# Patient Record
Sex: FEMALE | Race: WHITE | NOT HISPANIC OR LATINO | Employment: FULL TIME | URBAN - METROPOLITAN AREA
[De-identification: names, ages, dates, MRNs, and addresses within clinical notes are randomized per-mention and may not be internally consistent; named-entity substitution may affect disease eponyms.]

---

## 2017-01-13 ENCOUNTER — TELEPHONE (OUTPATIENT)
Dept: GASTROENTEROLOGY | Facility: CLINIC | Age: 57
End: 2017-01-13

## 2017-01-13 DIAGNOSIS — R11.0 NAUSEA: ICD-10-CM

## 2017-01-13 DIAGNOSIS — R10.13 EPIGASTRIC ABDOMINAL PAIN: Primary | ICD-10-CM

## 2017-01-13 NOTE — TELEPHONE ENCOUNTER
----- Message from Olya Cadet MA sent at 1/13/2017  8:47 AM CST -----  Contact: [phone]/self       ----- Message -----     From: Cindy Hernandez     Sent: 1/13/2017   7:43 AM       To: Imer JACOBSEN Staff    Pt requesting to speak with you , states she was supposed to get an MRI done but she doesn't know what type of MRI . Please advise

## 2017-01-13 NOTE — TELEPHONE ENCOUNTER
Spoke to pt and schedule her for her Upper EUS with Dr Rowley on 1/19/17.  Prep instructions was discussed and mailed to pt.

## 2017-01-13 NOTE — TELEPHONE ENCOUNTER
She had abnormal pancreas on CT with epigastric pain and nausea.  Originally recommended MRI but after discussion with Dr. Guido, ( who talked with the patient at the time of her EGD about this) changed to EUS with Dr. Rowley.        I have discussed  This with the patient.  Can you please call to schedule EUS and labs.

## 2017-01-19 ENCOUNTER — ANESTHESIA (OUTPATIENT)
Dept: ENDOSCOPY | Facility: HOSPITAL | Age: 57
End: 2017-01-19
Payer: COMMERCIAL

## 2017-01-19 ENCOUNTER — SURGERY (OUTPATIENT)
Age: 57
End: 2017-01-19

## 2017-01-19 ENCOUNTER — HOSPITAL ENCOUNTER (OUTPATIENT)
Facility: HOSPITAL | Age: 57
Discharge: HOME | End: 2017-01-19
Attending: INTERNAL MEDICINE | Admitting: INTERNAL MEDICINE
Payer: COMMERCIAL

## 2017-01-19 ENCOUNTER — ANESTHESIA EVENT (OUTPATIENT)
Dept: ENDOSCOPY | Facility: HOSPITAL | Age: 57
End: 2017-01-19
Payer: COMMERCIAL

## 2017-01-19 VITALS
HEART RATE: 90 BPM | BODY MASS INDEX: 27.31 KG/M2 | OXYGEN SATURATION: 98 % | HEIGHT: 64 IN | RESPIRATION RATE: 20 BRPM | DIASTOLIC BLOOD PRESSURE: 89 MMHG | WEIGHT: 160 LBS | SYSTOLIC BLOOD PRESSURE: 156 MMHG | TEMPERATURE: 99 F

## 2017-01-19 DIAGNOSIS — R10.13 EPIGASTRIC ABDOMINAL PAIN: ICD-10-CM

## 2017-01-19 PROBLEM — Q45.3 PANCREATIC ABNORMALITY: Status: ACTIVE | Noted: 2017-01-19

## 2017-01-19 PROBLEM — R10.30 LOWER ABDOMINAL PAIN: Status: ACTIVE | Noted: 2017-01-19

## 2017-01-19 PROCEDURE — 25000003 PHARM REV CODE 250: Performed by: NURSE ANESTHETIST, CERTIFIED REGISTERED

## 2017-01-19 PROCEDURE — 25000003 PHARM REV CODE 250: Performed by: INTERNAL MEDICINE

## 2017-01-19 PROCEDURE — 37000009 HC ANESTHESIA EA ADD 15 MINS: Performed by: INTERNAL MEDICINE

## 2017-01-19 PROCEDURE — 88172 CYTP DX EVAL FNA 1ST EA SITE: CPT | Performed by: PATHOLOGY

## 2017-01-19 PROCEDURE — 43242 EGD US FINE NEEDLE BX/ASPIR: CPT | Performed by: INTERNAL MEDICINE

## 2017-01-19 PROCEDURE — 88172 CYTP DX EVAL FNA 1ST EA SITE: CPT | Mod: 26,,, | Performed by: PATHOLOGY

## 2017-01-19 PROCEDURE — 43242 EGD US FINE NEEDLE BX/ASPIR: CPT | Mod: ,,, | Performed by: INTERNAL MEDICINE

## 2017-01-19 PROCEDURE — 63600175 PHARM REV CODE 636 W HCPCS: Performed by: NURSE ANESTHETIST, CERTIFIED REGISTERED

## 2017-01-19 PROCEDURE — 37000008 HC ANESTHESIA 1ST 15 MINUTES: Performed by: INTERNAL MEDICINE

## 2017-01-19 PROCEDURE — 88173 CYTOPATH EVAL FNA REPORT: CPT | Mod: 26,,, | Performed by: PATHOLOGY

## 2017-01-19 PROCEDURE — 27201628 HC NEEDLE, EUSN-1, EUSN-3: Performed by: INTERNAL MEDICINE

## 2017-01-19 RX ORDER — MIDAZOLAM HYDROCHLORIDE 1 MG/ML
INJECTION, SOLUTION INTRAMUSCULAR; INTRAVENOUS
Status: DISCONTINUED | OUTPATIENT
Start: 2017-01-19 | End: 2017-01-19

## 2017-01-19 RX ORDER — SODIUM CHLORIDE 0.9 % (FLUSH) 0.9 %
3 SYRINGE (ML) INJECTION
Status: DISCONTINUED | OUTPATIENT
Start: 2017-01-19 | End: 2017-01-19 | Stop reason: HOSPADM

## 2017-01-19 RX ORDER — PROPOFOL 10 MG/ML
VIAL (ML) INTRAVENOUS
Status: DISCONTINUED | OUTPATIENT
Start: 2017-01-19 | End: 2017-01-19

## 2017-01-19 RX ORDER — SODIUM CHLORIDE 9 MG/ML
INJECTION, SOLUTION INTRAVENOUS CONTINUOUS
Status: DISCONTINUED | OUTPATIENT
Start: 2017-01-19 | End: 2017-01-19 | Stop reason: HOSPADM

## 2017-01-19 RX ORDER — KETAMINE HYDROCHLORIDE 50 MG/ML
INJECTION, SOLUTION INTRAMUSCULAR; INTRAVENOUS
Status: DISCONTINUED | OUTPATIENT
Start: 2017-01-19 | End: 2017-01-19

## 2017-01-19 RX ORDER — SODIUM CHLORIDE 0.9 % (FLUSH) 0.9 %
3 SYRINGE (ML) INJECTION EVERY 8 HOURS
Status: CANCELLED | OUTPATIENT
Start: 2017-01-19

## 2017-01-19 RX ORDER — HYDROMORPHONE HYDROCHLORIDE 2 MG/ML
0.5 INJECTION, SOLUTION INTRAMUSCULAR; INTRAVENOUS; SUBCUTANEOUS EVERY 5 MIN PRN
Status: CANCELLED | OUTPATIENT
Start: 2017-01-19

## 2017-01-19 RX ORDER — PROPOFOL 10 MG/ML
VIAL (ML) INTRAVENOUS CONTINUOUS PRN
Status: DISCONTINUED | OUTPATIENT
Start: 2017-01-19 | End: 2017-01-19

## 2017-01-19 RX ORDER — LIDOCAINE HCL/PF 100 MG/5ML
SYRINGE (ML) INTRAVENOUS
Status: DISCONTINUED | OUTPATIENT
Start: 2017-01-19 | End: 2017-01-19

## 2017-01-19 RX ORDER — HYDROMORPHONE HYDROCHLORIDE 2 MG/ML
0.2 INJECTION, SOLUTION INTRAMUSCULAR; INTRAVENOUS; SUBCUTANEOUS EVERY 5 MIN PRN
Status: CANCELLED | OUTPATIENT
Start: 2017-01-19

## 2017-01-19 RX ORDER — DIPHENHYDRAMINE HYDROCHLORIDE 50 MG/ML
25 INJECTION INTRAMUSCULAR; INTRAVENOUS EVERY 6 HOURS PRN
Status: CANCELLED | OUTPATIENT
Start: 2017-01-19

## 2017-01-19 RX ORDER — ACETAMINOPHEN 10 MG/ML
1000 INJECTION, SOLUTION INTRAVENOUS ONCE
Status: DISCONTINUED | OUTPATIENT
Start: 2017-01-19 | End: 2017-01-19 | Stop reason: HOSPADM

## 2017-01-19 RX ORDER — ONDANSETRON 2 MG/ML
4 INJECTION INTRAMUSCULAR; INTRAVENOUS DAILY PRN
Status: CANCELLED | OUTPATIENT
Start: 2017-01-19

## 2017-01-19 RX ORDER — GLYCOPYRROLATE 0.2 MG/ML
INJECTION INTRAMUSCULAR; INTRAVENOUS
Status: DISCONTINUED | OUTPATIENT
Start: 2017-01-19 | End: 2017-01-19

## 2017-01-19 RX ADMIN — TOPICAL ANESTHETIC 1 EACH: 200 SPRAY DENTAL; PERIODONTAL at 08:01

## 2017-01-19 RX ADMIN — PROPOFOL 117 MCG/KG/MIN: 10 INJECTION, EMULSION INTRAVENOUS at 08:01

## 2017-01-19 RX ADMIN — GLYCOPYRROLATE 0.4 MG: 0.2 INJECTION, SOLUTION INTRAMUSCULAR; INTRAVENOUS at 08:01

## 2017-01-19 RX ADMIN — SODIUM CHLORIDE: 0.9 INJECTION, SOLUTION INTRAVENOUS at 08:01

## 2017-01-19 RX ADMIN — KETAMINE HYDROCHLORIDE 25 MG: 50 INJECTION, SOLUTION, CONCENTRATE INTRAMUSCULAR; INTRAVENOUS at 08:01

## 2017-01-19 RX ADMIN — MIDAZOLAM 2 MG: 1 INJECTION INTRAMUSCULAR; INTRAVENOUS at 08:01

## 2017-01-19 RX ADMIN — LIDOCAINE HYDROCHLORIDE 100 MG: 20 INJECTION, SOLUTION INTRAVENOUS at 08:01

## 2017-01-19 RX ADMIN — PROPOFOL 50 MG: 10 INJECTION, EMULSION INTRAVENOUS at 08:01

## 2017-01-19 NOTE — DISCHARGE INSTRUCTIONS
Post Upper EUS Instructions:     Allyssa Ruiz  1/19/2017  Dwight Carter*    1. Do Not eat or drink anything for 1 hour. Try sips of water first. If tolerated, resume your regular diet or one recommended by your physician.  2. Do not drive, or operate machinery, make critical decisions, or do activities that require coordination or balance for 24 hours.  3. You may experience a sore throat for 24 to 48 hours. You may use throat lozenges or gargle with warm salt water to relieve the discomfort.  4. Because air was put into your stomach during the procedure, you may experience some belching.   Go directly to the emergency room if you notice any of the following:                              Chills and/or fever over 101                Persistent vomiting or vomiting with blood                Severe abdominal pain, other than gas cramps                Severe chest pain                Black, tarry stools    If you have any questions or problems, please call your Physician:    Dwight Carter* Phone: ***    Lab Results: [phone]    If a complication or emergency situation arises and you are unable to reach your Physician - GO TO THE EMERGENCY ROOM.

## 2017-01-19 NOTE — H&P
"Short Stay Endoscopy History and Physical  [facility] [place] [facility] [place]    PCP: Ruperto Duke Jr, MD  Referring MD: LJ Guido MD/KEENAN Riley    Procedure - EUS  ASA - per anesthesia  History of Anesthesia problems - no  Family history Anesthesia problems -  no   Plan of anesthesia - MAC    HPI:  This is a 56 y.o. female here for evaluation of : 25 Oct 16 CT findings - "Few subcentimeter hypodensities are noted within the pancreatic head, incompletely characterized.  No pancreatic ductal dilatation." She is also S/P EGD of 2 Nov 16 that showed small HH and several fundic gland gastric polyps with gastritis (H.p. NEG). Evaluation centers around prior epigastric pains. Today, patient is reporting intermittent S-I joint pains and lower abd pains with intermittent nausea. NO lipase available. Though MRI was initially scheduled, that study was put on hold and EUS scheduled instead. NO weight loss.       Dysphagia - no  Abdominal pain - Yes  Diarrhea - no    ROS:  Constitutional: No fevers, chills, No weight loss  CV: No chest pain  Pulm: No cough, No shortness of breath  Ophtho: No vision changes  GI: see HPI  Derm: No rash    Medical History:  has a past medical history of Hypertension. Gastric polyps; HH;     Surgical History:  has a past surgical history that includes lasix; Tubal ligation; Tonsillectomy; and Breast surgery (Bilateral).    Family History: family history includes Heart attack in her brother and mother..     Social History:  reports that she has never smoked. She does not have any smokeless tobacco history on file. She reports that she drinks about 6.0 oz of alcohol per week  She reports that she does not use illicit drugs.    Review of patient's allergies indicates:   Allergen Reactions    Pcn [penicillins] Other (See Comments)     Seizure as a child        Medications:   Prescriptions Prior to Admission   Medication Sig Dispense Refill Last Dose    losartan (COZAAR) 100 MG tablet   9 " 1/18/2017 at 7pm    ranitidine (ZANTAC) 150 MG tablet Take 1 tablet (150 mg total) by mouth 2 (two) times daily. 60 tablet 3        Objective Findings:    Vital Signs:   Vitals:    01/19/17 0816   BP: (!) 147/85   Pulse: 71   Resp: 18   Temp: 98.5 °F (36.9 °C)       Physical Exam:  General Appearance: Well appearing in no acute distress  Eyes:    No scleral icterus  ENT: Neck supple, Lips, mucosa, and tongue normal; teeth and gums normal  Lungs: CTA anteriorly  Heart:  Regular rate, S1, S2 normal, no murmurs heard.  Abdomen: Obese, soft, non tender, non distended with normal bowel sounds. No hepatosplenomegaly, ascites, or mass.  Extremities: No edema  Skin: No rash    Labs:  Lab Results   Component Value Date    CHOL 238 (H) 03/11/2016    TRIG 98 03/11/2016    HDL 63 03/11/2016    ALT 32 03/11/2016    AST 23 03/11/2016     03/11/2016    K 4.7 03/11/2016     03/11/2016    CREATININE 0.9 03/11/2016    BUN 16 03/11/2016    CO2 31 (H) 03/11/2016    TSH 2.911 03/11/2016       Plan:  Will proceed with EUS, upper. The impression and plan was discussed in detail with the patient and family. All questions have been answered and the patient voices understanding of our plan at this point. The risk of the procedure was discussed in detail which includes but not limited to bleeding, infection, perforation in some cases requiring surgery and pancreatitis with its spectrum of complications.       Dwight Rowley MD, FACP, FACG, AGAF [facility]  [address], [address], [place] [address]  Phone: [phone] Fax: [phone] [address], [address], [place] [address]  Phone: [phone] Fax: [phone]

## 2017-01-19 NOTE — ANESTHESIA POSTPROCEDURE EVALUATION
"Anesthesia Post Evaluation    Patient: Allyssa Ruiz    Procedure(s) Performed: Procedure(s) (LRB):  ULTRASOUND-ENDOSCOPIC-UPPER (N/A)    Final Anesthesia Type: MAC  Patient location during evaluation: GI PACU  Patient participation: Yes- Able to Participate  Level of consciousness: awake and alert  Post-procedure vital signs: reviewed and stable  Pain management: adequate  Airway patency: patent  PONV status at discharge: No PONV  Anesthetic complications: no      Cardiovascular status: blood pressure returned to baseline and hemodynamically stable  Respiratory status: unassisted, spontaneous ventilation and room air  Hydration status: euvolemic  Follow-up not needed.        Visit Vitals    BP (!) 147/85 (BP Location: Left arm, Patient Position: Sitting, BP Method: Automatic)    Pulse 71    Temp 36.9 °C (98.5 °F) (Oral)    Resp 18    Ht 5' 4" (1.626 m)    Wt 72.6 kg (160 lb)    SpO2 98%    Breastfeeding No    BMI 27.46 kg/m2       Pain/Rubén Score: Pain Assessment Performed: Yes (1/19/2017  8:18 AM)      "

## 2017-01-19 NOTE — IP AVS SNAPSHOT
[facility]  [address]  [place] [address]  Phone: [phone]           Patient Discharge Instructions     Our goal is to set you up for success. This packet includes information on your condition, medications, and your home care. It will help you to care for yourself so you don't get sicker and need to go back to the hospital.     Please ask your nurse if you have any questions.        There are many details to remember when preparing to leave the hospital. Here is what you will need to do:    1. Take your medicine. If you are prescribed medications, review your Medication List in the following pages. You may have new medications to  at the pharmacy and others that you'll need to stop taking. Review the instructions for how and when to take your medications. Talk with your doctor or nurses if you are unsure of what to do.     2. Go to your follow-up appointments. Specific follow-up information is listed in the following pages. Your may be contacted by a transition nurse or clinical provider about future appointments. Be sure we have all of the phone numbers to reach you, if needed. Please contact your provider's office if you are unable to make an appointment.     3. Watch for warning signs. Your doctor or nurse will give you detailed warning signs to watch for and when to call for assistance. These instructions may also include educational information about your condition. If you experience any of warning signs to your health, call your doctor.               ** Verify the list of medication(s) below is accurate and up to date. Carry this with you in case of emergency. If your medications have changed, please notify your healthcare provider.             Medication List      CONTINUE taking these medications        Additional Info                      losartan 100 MG tablet   Commonly known as:  COZAAR   Refills:  9      Begin Date    AM    Noon    PM    Bedtime       ranitidine 150  MG tablet   Commonly known as:  ZANTAC   Quantity:  60 tablet   Refills:  3   Dose:  150 mg    Instructions:  Take 1 tablet (150 mg total) by mouth 2 (two) times daily.     Begin Date    AM    Noon    PM    Bedtime                  Please bring to all follow up appointments:    1. A copy of your discharge instructions.  2. All medicines you are currently taking in their original bottles.  3. Identification and insurance card.    Please arrive 15 minutes ahead of scheduled appointment time.    Please call 24 hours in advance if you must reschedule your appointment and/or time.        Follow-up Information     Follow up with Ruperto Duke Jr, MD.    Specialty:  Internal Medicine    Why:  As needed    Contact information:    [address]  [address] [place] [address]  [facility] [id number] [phone]          Follow up with KEENAN Ramirez In 4 weeks.    Specialty:  Gastroenterology    Contact information:    [address]  [place] [id number] [phone]          Follow up with Dwight Rowley MD.    Specialty:  Gastroenterology    Why:  As needed, Office will call with biopsy / pathology report    Contact information:    [address]  [address]  [place] [id number] [phone]          Discharge Instructions     Future Orders    Diet general     Questions:    Total calories:      Fat restriction, if any:      Protein restriction, if any:      Na restriction, if any:      Fluid restriction:      Additional restrictions:          Discharge Instructions       Post Upper EUS Instructions:     Allyssa Ruiz  1/19/2017  Dwight Carter*    1. Do Not eat or drink anything for 1 hour. Try sips of water first. If tolerated, resume your regular diet or one recommended by your physician.  2. Do not drive, or operate machinery, make critical decisions, or do activities that require coordination or balance for 24 hours.  3. You may experience a sore throat for 24 to 48 hours. You may use throat  "lozenges or gargle with warm salt water to relieve the discomfort.  4. Because air was put into your stomach during the procedure, you may experience some belching.   Go directly to the emergency room if you notice any of the following:                              Chills and/or fever over 101                Persistent vomiting or vomiting with blood                Severe abdominal pain, other than gas cramps                Severe chest pain                Black, tarry stools    If you have any questions or problems, please call your Physician:    Dwight Carter* Phone: ***    Lab Results: [phone]    If a complication or emergency situation arises and you are unable to reach your Physician - GO TO THE EMERGENCY ROOM.        Primary Diagnosis     Your primary diagnosis was:  Abdominal Pain, Pit Of Stomach      Admission Information     Date & Time Provider Department CSN    1/19/2017  6:53 AM Dwight Rowley MD [facility] [id number]      Care Providers     Provider Role Specialty Primary office phone    Dwight Rowley MD Attending Provider Gastroenterology [phone]    Dwight Rowley MD Surgeon  Gastroenterology [phone]      Your Vitals Were     BP Pulse Temp Resp Height Weight    154/83 (Patient Position: Lying, BP Method: Automatic) 74 98.5 °F (36.9 °C) (Oral) 18 5' 4" (1.626 m) 72.6 kg (160 lb)    SpO2 BMI             95% 27.46 kg/m2         Recent Lab Values     No lab values to display.      Pending Labs     Order Current Status    Cytology Specimen-FNA Radiology Guided, Bronch/EBUS, EUS/GI with Path Adequacy Collected (01/19/17 1041)      Allergies as of 1/19/2017        Reactions    Pcn [Penicillins] Other (See Comments)    Seizure as a child       [facility]     [facility] Nurse Care Line - 24/7 Assistance  Unless otherwise directed by your provider, please contact [facility], our nurse care line that is available " for 24/7 assistance.     Registered nurses in the [facility] provide clinical advisement, health education, appointment booking, and other advisory services.  Call for this free service at [phone].        Advance Directives     An advance directive is a document which, in the event you are no longer able to make decisions for yourself, tells your healthcare team what kind of treatment you do or do not want to receive, or who you would like to make those decisions for you.  If you do not currently have an advance directive, [facility] encourages you to create one.  For more information call:  [phone]), [phone] ([phone]),  or log on to [email or web address].        Language Assistance Services     ATTENTION: Language assistance services are available, free of charge. Please call [phone].      ATENCIÓN: Si habla español, tiene a ellis disposición servicios gratuitos de asistencia lingüística. Llame al [phone].     [facility] Ý: N?u b?n nói Ti?ng Vi?t, có các d?ch v? h? tr? ngôn ng? mi?n phí dành cho b?n. G?i s? [phone].         [facility] complies with applicable Federal civil rights laws and does not discriminate on the basis of race, color, national origin, age, disability, or sex.

## 2017-01-19 NOTE — TRANSFER OF CARE
"Anesthesia Transfer of Care Note    Patient: Allyssa Ruiz    Procedure(s) Performed: Procedure(s) (LRB):  ULTRASOUND-ENDOSCOPIC-UPPER (N/A)    Patient location: GI    Anesthesia Type: MAC    Transport from OR: Transported from OR on room air with adequate spontaneous ventilation    Post pain: adequate analgesia    Post assessment: tolerated procedure well and no apparent anesthetic complications    Post vital signs: stable    Level of consciousness: awake, alert and oriented    Nausea/Vomiting: no nausea/vomiting    Complications: none          Last vitals:   Visit Vitals    BP (!) 147/85 (BP Location: Left arm, Patient Position: Sitting, BP Method: Automatic)    Pulse 71    Temp 36.9 °C (98.5 °F) (Oral)    Resp 18    Ht 5' 4" (1.626 m)    Wt 72.6 kg (160 lb)    SpO2 98%    Breastfeeding No    BMI 27.46 kg/m2     "

## 2017-01-19 NOTE — ANESTHESIA PREPROCEDURE EVALUATION
01/19/2017     Allyssa Ruiz is a 56 y.o., female here for EUS.    Vitals:    01/19/17 0816   BP: (!) 147/85   Pulse: 71   Resp: 18   Temp: 36.9 °C (98.5 °F)     Review of patient's allergies indicates:   Allergen Reactions    Pcn [penicillins] Other (See Comments)     Seizure as a child      Past Medical History   Diagnosis Date    Hypertension      Past Surgical History   Procedure Laterality Date    Lasix      Tubal ligation      Tonsillectomy      Breast surgery Bilateral      breast augmentation     Patient Active Problem List   Diagnosis    Family history of ischemic heart disease    Essential hypertension    Epigastric pain    Epigastric abdominal pain     Lab Results   Component Value Date    CHOL 238 (H) 03/11/2016    TRIG 98 03/11/2016    HDL 63 03/11/2016    ALT 32 03/11/2016    AST 23 03/11/2016     03/11/2016    K 4.7 03/11/2016     03/11/2016    CREATININE 0.9 03/11/2016    BUN 16 03/11/2016    CO2 31 (H) 03/11/2016    TSH 2.911 03/11/2016       [facility] Anesthesia Evaluation    I have reviewed the Patient Summary Reports.    I have reviewed the Nursing Notes.   I have reviewed the Medications.     Review of Systems  Anesthesia Hx:  No problems with previous Anesthesia  Denies Family Hx of Anesthesia complications.   Denies Personal Hx of Anesthesia complications.   Social:  Non-Smoker, No Alcohol Use        Physical Exam  General:  Obesity    Airway/Jaw/Neck:  Airway Findings: Mallampati: III TM Distance: 4 - 6 cm  Jaw/Neck Findings:  Neck ROM: Normal ROM      Dental:  Dental Findings: In tactStates sometimes top tooth comes out        Mental Status:  Mental Status Findings:  Cooperative, Alert and Oriented         Anesthesia Plan  Type of Anesthesia, risks & benefits discussed:  Anesthesia Type:  general, MAC  Patient's Preference:   Intra-op Monitoring Plan:   Intra-op  Monitoring Plan Comments:   Post Op Pain Control Plan:   Post Op Pain Control Plan Comments:   Induction:    Beta Blocker:         Informed Consent: Patient understands risks and agrees with Anesthesia plan.  Questions answered. Anesthesia consent signed with patient.  ASA Score: 2     Day of Surgery Review of History & Physical: I have interviewed and examined the patient. I have reviewed the patient's H&P dated:  There are no significant changes.          Ready For Surgery From Anesthesia Perspective.

## 2017-01-26 ENCOUNTER — TELEPHONE (OUTPATIENT)
Dept: GASTROENTEROLOGY | Facility: CLINIC | Age: 57
End: 2017-01-26

## 2017-01-26 NOTE — TELEPHONE ENCOUNTER
Spoke with patient states that she was supposed to have lab work completed. Advised Reta to place a written order for patient to go to [facility]. Patient was informed to  request at . Verbalized understanding.

## 2017-01-26 NOTE — TELEPHONE ENCOUNTER
Left message for patient to return call to office to schedule appointment for blood work.   ----- Message from Cindy Hernandez sent at 1/26/2017  8:45 AM CST -----  Contact: [phone]/ self   Pt its requesting to speak with you in regarding blood work she was supposed to get done , but she haven't heard anything about scheduling them  . Please advise

## 2017-01-26 NOTE — TELEPHONE ENCOUNTER
Patient walked in to get written order for lab work. Patient also requesting to have results read for EUS. Advised patient message would be sent to NP to contact her.

## 2017-01-26 NOTE — TELEPHONE ENCOUNTER
----- Message from Cindy Hernandez sent at 1/26/2017  8:41 AM CST -----  Contact: [phone]/ self   Pt its requesting test results done 01/19/17 . Please advise

## 2017-01-27 LAB — COPY RECEIVED FROM: NORMAL

## 2017-01-30 ENCOUNTER — TELEPHONE (OUTPATIENT)
Dept: GASTROENTEROLOGY | Facility: CLINIC | Age: 57
End: 2017-01-30

## 2017-01-30 DIAGNOSIS — K86.9 PANCREATIC LESION: Primary | ICD-10-CM

## 2017-01-30 NOTE — TELEPHONE ENCOUNTER
Spoke with patient to inform her that labs were fine and she should be expecting phone call from . Verbalized understanding.  ----- Message from KEENAN Ramirez sent at 1/30/2017  1:32 PM CST -----  Let pt know that labs are fine.  We will call after Dr. Rowley reviews path studies for further recommendations.

## 2017-01-31 ENCOUNTER — TELEPHONE (OUTPATIENT)
Dept: GASTROENTEROLOGY | Facility: CLINIC | Age: 57
End: 2017-01-31

## 2017-01-31 NOTE — TELEPHONE ENCOUNTER
----- Message from Yasmine Wayne sent at 1/31/2017  9:18 AM CST -----  Contact: [phone]  Patient is returning your call

## 2017-02-01 ENCOUNTER — TELEPHONE (OUTPATIENT)
Dept: GASTROENTEROLOGY | Facility: CLINIC | Age: 57
End: 2017-02-01

## 2017-02-01 NOTE — TELEPHONE ENCOUNTER
Spoke with pt and was notified that pt could have the CT of abdomen with pancreatic protocol done at [facility] . Pt  Will check with her insurance company first and will get back in touch with our office.

## 2017-02-01 NOTE — TELEPHONE ENCOUNTER
----- Message from Renee Rios sent at 2/1/2017  3:01 PM CST -----  Contact: self/[phone]  Patient is returning your call about a CAT scan.  Please advise

## 2017-02-06 ENCOUNTER — TELEPHONE (OUTPATIENT)
Dept: GASTROENTEROLOGY | Facility: CLINIC | Age: 57
End: 2017-02-06

## 2017-02-06 NOTE — TELEPHONE ENCOUNTER
Pt was just calling to let us know that she would be proceeding with CT of abdomen with pancreatic protocol at [facility] .

## 2017-02-06 NOTE — TELEPHONE ENCOUNTER
----- Message from Reta Jenkins sent at 2/6/2017  2:49 PM CST -----  Contact: Self [phone]  Patient Returning Your Phone Call

## 2017-02-06 NOTE — TELEPHONE ENCOUNTER
----- Message from Renee Rios sent at 2/6/2017 11:18 AM CST -----  Contact: self/[phone]  Patient would like to speak with you about a test she is scheduled for on tomorrow 02/07/17.  Please advise

## 2017-02-06 NOTE — TELEPHONE ENCOUNTER
Spoke with patient to inform her that all of her organs will be checked but CT will focus more on pancreatis. Patient verbalized understanding.  ----- Message from Reta Jenkins sent at 2/6/2017  2:47 PM CST -----  Contact: Self [phone]  Patient Returning Your Phone Call

## 2017-02-06 NOTE — TELEPHONE ENCOUNTER
----- Message from Renee Rios sent at 2/6/2017 11:17 AM CST -----  Contact: self/[phone]  Patient would like to speak with you about a test she is scheduled for on tomorrow 02/07/17.  Please advise

## 2017-02-07 ENCOUNTER — HOSPITAL ENCOUNTER (OUTPATIENT)
Dept: RADIOLOGY | Facility: HOSPITAL | Age: 57
Discharge: HOME | End: 2017-02-07
Attending: INTERNAL MEDICINE
Payer: COMMERCIAL

## 2017-02-07 DIAGNOSIS — K86.9 PANCREATIC LESION: ICD-10-CM

## 2017-02-07 PROCEDURE — 74170 CT ABD WO CNTRST FLWD CNTRST: CPT | Mod: 26,,, | Performed by: RADIOLOGY

## 2017-02-07 PROCEDURE — 25500020 PHARM REV CODE 255: Performed by: INTERNAL MEDICINE

## 2017-02-07 PROCEDURE — 74170 CT ABD WO CNTRST FLWD CNTRST: CPT | Mod: TC

## 2017-02-07 RX ADMIN — IOHEXOL 75 ML: 350 INJECTION, SOLUTION INTRAVENOUS at 10:02

## 2017-05-11 LAB
ALBUMIN SERPL-MCNC: 4.6 G/DL (ref 3.6–5.1)
ALBUMIN/GLOB SERPL: 1.9 (CALC) (ref 1–2.5)
ALP SERPL-CCNC: 61 U/L (ref 33–130)
ALT SERPL-CCNC: 25 U/L (ref 6–29)
AST SERPL-CCNC: 18 U/L (ref 10–35)
BASOPHILS # BLD AUTO: 57 CELLS/UL (ref 0–200)
BASOPHILS NFR BLD AUTO: 0.8 %
BILIRUB SERPL-MCNC: 0.8 MG/DL (ref 0.2–1.2)
BUN SERPL-MCNC: 15 MG/DL (ref 7–25)
BUN/CREAT SERPL: NORMAL (CALC) (ref 6–22)
CALCIUM SERPL-MCNC: 9.3 MG/DL (ref 8.6–10.4)
CHLORIDE SERPL-SCNC: 102 MMOL/L (ref 98–110)
CO2 SERPL-SCNC: 30 MMOL/L (ref 20–31)
COPY(IES) SENT TO:: NORMAL
CREAT SERPL-MCNC: 0.82 MG/DL (ref 0.5–1.05)
EOSINOPHIL # BLD AUTO: 298 CELLS/UL (ref 15–500)
EOSINOPHIL NFR BLD AUTO: 4.2 %
ERYTHROCYTE [DISTWIDTH] IN BLOOD BY AUTOMATED COUNT: 13 % (ref 11–15)
GFR SERPL CREATININE-BSD FRML MDRD: 79 ML/MIN/1.73M2
GLOBULIN SER CALC-MCNC: 2.4 G/DL (CALC) (ref 1.9–3.7)
GLUCOSE SERPL-MCNC: 84 MG/DL (ref 65–99)
HCT VFR BLD AUTO: 43.2 % (ref 35–45)
HGB BLD-MCNC: 14.7 G/DL (ref 11.7–15.5)
LIPASE SERPL-CCNC: 27 U/L (ref 7–60)
LYMPHOCYTES # BLD AUTO: 2400 CELLS/UL (ref 850–3900)
LYMPHOCYTES NFR BLD AUTO: 33.8 %
MCH RBC QN AUTO: 31.4 PG (ref 27–33)
MCHC RBC AUTO-ENTMCNC: 34 G/DL (ref 32–36)
MCV RBC AUTO: 92.3 FL (ref 80–100)
MONOCYTES # BLD AUTO: 682 CELLS/UL (ref 200–950)
MONOCYTES NFR BLD AUTO: 9.6 %
NEUTROPHILS # BLD AUTO: 3664 CELLS/UL (ref 1500–7800)
NEUTROPHILS NFR BLD AUTO: 51.6 %
PLATELET # BLD AUTO: 220 THOUSAND/UL (ref 140–400)
PMV BLD REES-ECKER: 9.1 FL (ref 7.5–12.5)
POTASSIUM SERPL-SCNC: 3.9 MMOL/L (ref 3.5–5.3)
PROT SERPL-MCNC: 7 G/DL (ref 6.1–8.1)
RBC # BLD AUTO: 4.67 MILLION/UL (ref 3.8–5.1)
SODIUM SERPL-SCNC: 140 MMOL/L (ref 135–146)
WBC # BLD AUTO: 7.1 THOUSAND/UL (ref 3.8–10.8)

## 2017-12-12 ENCOUNTER — OFFICE VISIT (OUTPATIENT)
Dept: GASTROENTEROLOGY | Facility: CLINIC | Age: 57
End: 2017-12-12
Payer: COMMERCIAL

## 2017-12-12 ENCOUNTER — LAB VISIT (OUTPATIENT)
Dept: LAB | Facility: HOSPITAL | Age: 57
End: 2017-12-12
Attending: NURSE PRACTITIONER
Payer: COMMERCIAL

## 2017-12-12 ENCOUNTER — TELEPHONE (OUTPATIENT)
Dept: GASTROENTEROLOGY | Facility: CLINIC | Age: 57
End: 2017-12-12

## 2017-12-12 VITALS
BODY MASS INDEX: 26.34 KG/M2 | SYSTOLIC BLOOD PRESSURE: 125 MMHG | HEART RATE: 63 BPM | DIASTOLIC BLOOD PRESSURE: 72 MMHG | HEIGHT: 64 IN | WEIGHT: 154.31 LBS

## 2017-12-12 DIAGNOSIS — R19.7 DIARRHEA, UNSPECIFIED TYPE: Primary | ICD-10-CM

## 2017-12-12 DIAGNOSIS — R10.9 ABDOMINAL CRAMPING: ICD-10-CM

## 2017-12-12 DIAGNOSIS — R11.0 NAUSEA: ICD-10-CM

## 2017-12-12 DIAGNOSIS — Q45.3 PANCREATIC ABNORMALITY: ICD-10-CM

## 2017-12-12 DIAGNOSIS — R19.7 DIARRHEA, UNSPECIFIED TYPE: ICD-10-CM

## 2017-12-12 DIAGNOSIS — R93.5 ABNORMAL ABDOMINAL CT SCAN: ICD-10-CM

## 2017-12-12 LAB
ALBUMIN SERPL BCP-MCNC: 3.9 G/DL
ALP SERPL-CCNC: 73 U/L
ALT SERPL W/O P-5'-P-CCNC: 37 U/L
ANION GAP SERPL CALC-SCNC: 8 MMOL/L
AST SERPL-CCNC: 23 U/L
BASOPHILS # BLD AUTO: 0.04 K/UL
BASOPHILS NFR BLD: 0.5 %
BILIRUB SERPL-MCNC: 0.9 MG/DL
BUN SERPL-MCNC: 16 MG/DL
CALCIUM SERPL-MCNC: 9.2 MG/DL
CHLORIDE SERPL-SCNC: 105 MMOL/L
CO2 SERPL-SCNC: 27 MMOL/L
CREAT SERPL-MCNC: 0.8 MG/DL
DIFFERENTIAL METHOD: ABNORMAL
EOSINOPHIL # BLD AUTO: 0.4 K/UL
EOSINOPHIL NFR BLD: 4.9 %
ERYTHROCYTE [DISTWIDTH] IN BLOOD BY AUTOMATED COUNT: 13.6 %
EST. GFR  (AFRICAN AMERICAN): >60 ML/MIN/1.73 M^2
EST. GFR  (NON AFRICAN AMERICAN): >60 ML/MIN/1.73 M^2
GLUCOSE SERPL-MCNC: 72 MG/DL
HCT VFR BLD AUTO: 43.3 %
HGB BLD-MCNC: 15.2 G/DL
LIPASE SERPL-CCNC: 37 U/L
LIPASE SERPL-CCNC: 37 U/L
LYMPHOCYTES # BLD AUTO: 2.5 K/UL
LYMPHOCYTES NFR BLD: 32.1 %
MCH RBC QN AUTO: 31.7 PG
MCHC RBC AUTO-ENTMCNC: 35.1 G/DL
MCV RBC AUTO: 90 FL
MONOCYTES # BLD AUTO: 0.8 K/UL
MONOCYTES NFR BLD: 10.4 %
NEUTROPHILS # BLD AUTO: 4.1 K/UL
NEUTROPHILS NFR BLD: 51.8 %
PLATELET # BLD AUTO: 252 K/UL
PMV BLD AUTO: 10.4 FL
POTASSIUM SERPL-SCNC: 3.6 MMOL/L
PROT SERPL-MCNC: 7.3 G/DL
RBC # BLD AUTO: 4.79 M/UL
SODIUM SERPL-SCNC: 140 MMOL/L
WBC # BLD AUTO: 7.91 K/UL

## 2017-12-12 PROCEDURE — 87046 STOOL CULTR AEROBIC BACT EA: CPT | Mod: 59

## 2017-12-12 PROCEDURE — 99213 OFFICE O/P EST LOW 20 MIN: CPT | Mod: S$GLB,,, | Performed by: NURSE PRACTITIONER

## 2017-12-12 PROCEDURE — 99999 PR PBB SHADOW E&M-EST. PATIENT-LVL III: CPT | Mod: PBBFAC,,, | Performed by: NURSE PRACTITIONER

## 2017-12-12 PROCEDURE — 87427 SHIGA-LIKE TOXIN AG IA: CPT | Mod: 59

## 2017-12-12 PROCEDURE — 87045 FECES CULTURE AEROBIC BACT: CPT

## 2017-12-12 PROCEDURE — 80053 COMPREHEN METABOLIC PANEL: CPT

## 2017-12-12 PROCEDURE — 83690 ASSAY OF LIPASE: CPT

## 2017-12-12 PROCEDURE — 85025 COMPLETE CBC W/AUTO DIFF WBC: CPT

## 2017-12-12 PROCEDURE — 87209 SMEAR COMPLEX STAIN: CPT

## 2017-12-12 PROCEDURE — 82656 EL-1 FECAL QUAL/SEMIQ: CPT

## 2017-12-12 PROCEDURE — 87329 GIARDIA AG IA: CPT

## 2017-12-12 PROCEDURE — 87449 NOS EACH ORGANISM AG IA: CPT

## 2017-12-12 PROCEDURE — 36415 COLL VENOUS BLD VENIPUNCTURE: CPT

## 2017-12-12 RX ORDER — DICYCLOMINE HYDROCHLORIDE 20 MG/1
20 TABLET ORAL EVERY 6 HOURS
Qty: 120 TABLET | Refills: 6 | Status: SHIPPED | OUTPATIENT
Start: 2017-12-12 | End: 2018-01-11

## 2017-12-12 NOTE — PROGRESS NOTES
Subjective:       Patient ID: Allyssa Ruiz is a 57 y.o. female.    Chief Complaint: Abdominal Pain and Abdominal Cramping    HPI  Reports diarrhea 8-10 times a day with liquid foul smelling stool.  She has had diarrhea for over 6 months.  She has used imodium, lomotil, flagyl with no improvement.  She describes dull abdominal pain continuously with episodic cramping and urge for bowel movement.  Nausea but no vomiting.  Early satiety at times.  Will feel better after eating but only for a short while.  She has extensive workup last year for abdominal pain and nausea with EGD, US, CT, EUS and repeat CT.  I have reviewed these reports.  EUS:  Normal examined duodenum.                        - Erythematous mucosa in the greater curvature of                         the gastric body and antrum.                        - Multiple gastric polyps.                        - Small hiatus hernia.                        - A sonographic appearance of the embryologic                         ventral pancreas (ventral anlage) with shadowing                         vs. hypodense mass was observed. Fine needle                         aspiration performed.                        - A cystic lesion was seen in the genu of the                         pancreas. Cytology results are pending. However,                         the endosonographic appearance is a benign (true)                         cyst. Fine needle aspiration for fluid performed.                        - There was no sign of significant pathology in the                         pancreatic head, in the genu of the pancreas, in                         the pancreatic body, in the uncinate process of the                         pancreas and in the main pancreatic duct    FNA:  Review of pathology report from EUS dated 19 Jan 17:  SPECIMEN  1) FNA Pancreas Head (EUS)  2) FNA Pancreas Cyst (EUS)  FINAL PATHOLOGIC DIAGNOSIS  1. FINE-NEEDLE ASPIRATE PANCREAS HEAD (ASPIRATE  "SMEARS):  -Atypical  -Rare atypical cell group seen  2. FINE-NEEDLE ASPIRATE INCREASED CYST (ASPIRATE SMEARS):  -No malignant cells identified  -Bloody sample  Note: 1. Rare atypical cell groups seen. Additional sampling may be helpful if indicated. Correlate clinically.    IMP:- unclear clinical importance of "atypical cells"    REC:- D/C pending request for MRI  - Proceed with CT with / wo IVC using "pancreatic protocol" (order placed)  - F/U w KEENAN Riley after CT completed    Repeat CT:  The pancreatic parenchyma has an unchanged appearance from what was seen in October of 2016.  Again seen are subcentimeter hypodensities within the pancreatic parenchyma possibly representing small cystic lesions versus prominence of normal fat interspersed within the parenchyma.    Dr. Rowley recommended at that time repeat EUS at 3-6 months which she has not completed.    She had colonoscopy in summer 2016 at another facility.    Review of Systems   Constitutional: Positive for appetite change and fatigue. Negative for activity change, fever and unexpected weight change.   Respiratory: Negative for shortness of breath.    Cardiovascular: Negative for chest pain.   Gastrointestinal: Positive for abdominal pain, diarrhea and nausea. Negative for blood in stool and vomiting.   Genitourinary: Negative.    Skin:        Dryness, rash that has resolved   Neurological: Negative.    Psychiatric/Behavioral: Negative.        Objective:      Physical Exam   Constitutional: She appears well-developed and well-nourished. No distress.   HENT:   Head: Normocephalic.   Eyes: No scleral icterus.   Cardiovascular: Normal rate.    Pulmonary/Chest: Effort normal. No respiratory distress.   Abdominal: Soft. Bowel sounds are normal. She exhibits no distension and no mass. There is no tenderness. There is no guarding.   Neurological: She is alert.   Skin: Skin is warm and dry. She is not diaphoretic.   Psychiatric: She has a normal mood " and affect. Her behavior is normal. Judgment and thought content normal.   Vitals reviewed.      Assessment:       1. Diarrhea, unspecified type    2. Abdominal cramping    3. Pancreatic abnormality        Plan:         Allyssa was seen today for abdominal pain and abdominal cramping.    Diagnoses and all orders for this visit:    Diarrhea, unspecified type  -     Comprehensive metabolic panel; Future  -     Lipase; Future  -     CBC auto differential; Future  -     Clostridium difficile EIA; Future  -     Giardia / Cryptosporidum, EIA; Future  -     Stool culture; Future  -     Stool Exam-Ova,Cysts,Parasites; Future  -     Pancreatic elastase, fecal; Future    Abdominal cramping    Pancreatic abnormality    Other orders  -     dicyclomine (BENTYL) 20 mg tablet; Take 1 tablet (20 mg total) by mouth every 6 (six) hours.    Labs and stool studies.  Bentyl  Can try xifaxan if all negative.    We have discussed recommendations to repeat EUS.  She is not able to schedule currently, but will call back when able.

## 2017-12-13 ENCOUNTER — TELEPHONE (OUTPATIENT)
Dept: GASTROENTEROLOGY | Facility: CLINIC | Age: 57
End: 2017-12-13

## 2017-12-13 LAB
C DIFF GDH STL QL: NEGATIVE
C DIFF TOX A+B STL QL IA: NEGATIVE

## 2017-12-13 NOTE — TELEPHONE ENCOUNTER
----- Message from KEENAN Ramirez sent at 12/13/2017  8:53 AM CST -----  Let her know that lab work looks fine and that we will call her when stool results are in

## 2017-12-14 LAB — O+P STL TRI STN: NORMAL

## 2017-12-15 LAB
CRYPTOSP AG STL QL IA: NEGATIVE
G LAMBLIA AG STL QL IA: NEGATIVE

## 2017-12-16 LAB
BACTERIA STL CULT: NORMAL
BACTERIA STL CULT: NORMAL

## 2017-12-19 ENCOUNTER — TELEPHONE (OUTPATIENT)
Dept: GASTROENTEROLOGY | Facility: CLINIC | Age: 57
End: 2017-12-19

## 2017-12-19 NOTE — TELEPHONE ENCOUNTER
----- Message from KEENAN Ramirez sent at 12/19/2017  1:06 PM CST -----  Let her know that stool studies are ok.  One remains in process and will call with those results when in

## 2017-12-21 ENCOUNTER — TELEPHONE (OUTPATIENT)
Dept: GASTROENTEROLOGY | Facility: CLINIC | Age: 57
End: 2017-12-21

## 2017-12-21 LAB
ELASTASE 1, FECAL: 266.1 UG E/G STOOL
ELASTASE INTERPRETATION: NORMAL

## 2017-12-21 NOTE — TELEPHONE ENCOUNTER
----- Message from KEENAN Ramirez sent at 12/21/2017  4:06 PM CST -----  All stool studies are normal

## 2017-12-22 ENCOUNTER — TELEPHONE (OUTPATIENT)
Dept: GASTROENTEROLOGY | Facility: CLINIC | Age: 57
End: 2017-12-22

## 2017-12-22 NOTE — TELEPHONE ENCOUNTER
Spoke with pt and informed of last message regarding remainder of stool studies normal, pt verbalized understanding      ----- Message from Steffanie Marcelo sent at 12/22/2017  8:24 AM CST -----  Contact: [phone]/self  Patient returning your call. Please advise

## 2018-04-09 ENCOUNTER — TELEPHONE (OUTPATIENT)
Dept: PODIATRY | Facility: CLINIC | Age: 58
End: 2018-04-09

## 2018-04-09 ENCOUNTER — OFFICE VISIT (OUTPATIENT)
Dept: PODIATRY | Facility: CLINIC | Age: 58
End: 2018-04-09
Payer: COMMERCIAL

## 2018-04-09 VITALS
WEIGHT: 154 LBS | HEIGHT: 64 IN | BODY MASS INDEX: 26.29 KG/M2 | DIASTOLIC BLOOD PRESSURE: 87 MMHG | SYSTOLIC BLOOD PRESSURE: 124 MMHG | HEART RATE: 64 BPM

## 2018-04-09 DIAGNOSIS — Q66.70 CONGENITAL CAVUS FOOT: ICD-10-CM

## 2018-04-09 DIAGNOSIS — M25.571 SINUS TARSI SYNDROME OF RIGHT FOOT: Primary | ICD-10-CM

## 2018-04-09 DIAGNOSIS — M79.671 FOOT PAIN, BILATERAL: ICD-10-CM

## 2018-04-09 DIAGNOSIS — M79.672 FOOT PAIN, BILATERAL: ICD-10-CM

## 2018-04-09 PROCEDURE — 3074F SYST BP LT 130 MM HG: CPT | Mod: CPTII,S$GLB,, | Performed by: PODIATRIST

## 2018-04-09 PROCEDURE — 3079F DIAST BP 80-89 MM HG: CPT | Mod: CPTII,S$GLB,, | Performed by: PODIATRIST

## 2018-04-09 PROCEDURE — 99999 PR PBB SHADOW E&M-EST. PATIENT-LVL III: CPT | Mod: PBBFAC,,, | Performed by: PODIATRIST

## 2018-04-09 PROCEDURE — 99203 OFFICE O/P NEW LOW 30 MIN: CPT | Mod: S$GLB,,, | Performed by: PODIATRIST

## 2018-04-09 NOTE — PROGRESS NOTES
"Subjective:      Patient ID: Allyssa Ruiz is a 57 y.o. female.    Chief Complaint: Ankle Pain and Foot Pain (Bilateral)    Eric is a 58 y/o female  has a past medical history of Hypertension. Allyssa is a 57 y.o. female who presents to the podiatry clinic  with complaint of  bilateral foot pain. Onset of the symptoms was several years ago. Precipitating event: none known. Current symptoms include: worsening symptoms after a period of activity. Aggravating factors: walking. Symptoms have gradually worsened. Patient has had no prior foot problems. Evaluation to date: none. Treatment to date: none. Patients rates pain 5/10 on pain scale.    Vitals:    04/09/18 0751   BP: 124/87   Pulse: 64   Weight: 69.9 kg (154 lb)   Height: 5' 4" (1.626 m)   PainSc: 0-No pain      Past Medical History:   Diagnosis Date    Hypertension        Past Surgical History:   Procedure Laterality Date    BREAST SURGERY Bilateral     breast augmentation    lasix      TONSILLECTOMY      TUBAL LIGATION         Family History   Problem Relation Age of Onset    Heart attack Mother     Heart attack Brother        Social History     Social History    Marital status:      Spouse name: N/A    Number of children: N/A    Years of education: N/A     Social History Main Topics    Smoking status: Never Smoker    Smokeless tobacco: None    Alcohol use 6.0 oz/week     10 Glasses of wine per week    Drug use: No    Sexual activity: Yes     Other Topics Concern    None     Social History Narrative    None       Current Outpatient Prescriptions   Medication Sig Dispense Refill    losartan (COZAAR) 100 MG tablet   9     No current facility-administered medications for this visit.        Review of patient's allergies indicates:   Allergen Reactions    Pcn [penicillins] Other (See Comments)     Seizure as a child          Review of Systems   Constitution: Negative for chills, fever, weakness and malaise/fatigue.   Cardiovascular: " Negative for chest pain, claudication and leg swelling.   Respiratory: Negative for cough and shortness of breath.    Skin: Negative for color change, dry skin, itching and poor wound healing.   Musculoskeletal: Positive for arthritis and joint pain. Negative for back pain, muscle cramps and muscle weakness.   Gastrointestinal: Negative for nausea and vomiting.   Neurological: Negative for numbness and paresthesias.   Psychiatric/Behavioral: Negative for altered mental status.           Objective:      Physical Exam   Constitutional: She is oriented to person, place, and time. She appears well-developed and well-nourished.   Cardiovascular: Intact distal pulses.    Pulses:       Dorsalis pedis pulses are 2+ on the right side, and 2+ on the left side.        Posterior tibial pulses are 2+ on the right side, and 2+ on the left side.   CFT< 3 secs all toes bilateral foot, skin temp warm bilateral foot, diminished digital hair growth bilateral foot, no lower extremity edema bilateral.     Musculoskeletal: She exhibits tenderness and deformity.        Right foot: There is decreased range of motion and deformity.        Left foot: There is decreased range of motion and deformity.        Feet:    Pes cavus noted, weston with mild collapse with weight bearing. Psuedoequinus noted, weston.        Neurological: She is alert and oriented to person, place, and time. No sensory deficit.   Skin: Skin is warm, dry and intact. Capillary refill takes less than 2 seconds. No ecchymosis and no rash noted. She is not diaphoretic. No cyanosis or erythema. No pallor. Nails show no clubbing.             Assessment:       Encounter Diagnoses   Name Primary?   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    Diagnoses and all orders for this visit:    Sinus tarsi syndrome of right foot  -     X-Ray Foot Complete Right;  "Future    Foot pain, bilateral  -     X-Ray Foot Complete Right; Future    Congenital cavus foot      I counseled the patient on her conditions, their implications and medical management.    Pt educated on importance of rigid soled shoe with rigid high arch supports to support foot type and decrease inflammation 2/2 to pressure.   Educated on stretching posterior compartment for equinus deformity to lessen pressure on forefoot to decrease pain and inflammation and allow healing.   RICE therapy, OTC NSAIDs to decrease foot pain and inflammation to allow healing.   RTC as specified    Delmy Ortega, PGY2    I have personally taken the history and examined this patient and agree with the resident's note as stated as above.   Rex Rosas DPM, FACFAS    Dispensed literature on and demonstrated calf muscle stretches. Reviewed appropriate shoe gear and discussed activity modification such as avoiding flat shoes and barefoot walking. Recommend 1/2-1" heel height.    Recommended Sof Sole Fit Series Arch Supports with neutral or high arch found on amazon.com    Recommend Dansco or Allegria shoes or Vionic shoes    Neutral tennis shoes with stiff shank such as Paredes Ghost or Glycerine.            "

## 2018-04-09 NOTE — PATIENT INSTRUCTIONS
Recommended Sof Sole Fit Series Arch Supports with neutral or high arch found on amazon.com    Recommend Dansco or Allegria shoes or Vionic shoes    Neutral tennis shoes with stiff shank such as Paredes Ghost or Glycerine.        Bent-Knee Calf Stretch    This exercise is designed to stretch and strengthen your feet and ankles. Before beginning the exercise, read through all the instructions. While exercising, breathe normally and dont bounce. If you feel any pain, stop the exercise. If pain persists, inform your healthcare provider:  · Stand an arms length away from a wall. Place the palms of your hands on the wall. Step forward about 12 inches with your ______ foot.  · Keeping toes pointed forward and both heels on the floor, bend both knees and lean forward. Hold for __30____ seconds. Relax.  · Repeat __3____ times. Do ___2-3___ sets a day.  Date Last Reviewed: 8/16/2015  © 5444-3123 [employer]. [address] [place] [address], [place] [address]. All rights reserved. This information is not intended as a substitute for professional medical care. Always follow your healthcare professional's instructions.

## 2018-04-09 NOTE — TELEPHONE ENCOUNTER
----- Message from Grace Cowart sent at 4/9/2018  7:31 AM CDT -----  Contact: self/[phone]  She is running about 10 minutes late.

## 2021-03-06 ENCOUNTER — IMMUNIZATION (OUTPATIENT)
Dept: FAMILY MEDICINE | Facility: CLINIC | Age: 61
End: 2021-03-06
Payer: COMMERCIAL

## 2021-03-06 DIAGNOSIS — Z23 NEED FOR VACCINATION: Primary | ICD-10-CM

## 2021-03-06 PROCEDURE — 0031A COVID-19,VECTOR-NR,RS-AD26,PF,0.5 ML DOSE VACCINE (JANSSEN): CPT | Mod: PBBFAC | Performed by: FAMILY MEDICINE

## 2021-12-09 ENCOUNTER — CLINICAL SUPPORT (OUTPATIENT)
Dept: OTHER | Facility: CLINIC | Age: 61
End: 2021-12-09
Payer: COMMERCIAL

## 2021-12-09 DIAGNOSIS — Z00.8 ENCOUNTER FOR OTHER GENERAL EXAMINATION: ICD-10-CM

## 2021-12-10 VITALS — BODY MASS INDEX: 26.43 KG/M2 | HEIGHT: 64 IN

## 2021-12-10 LAB
GLUCOSE SERPL-MCNC: 83 MG/DL (ref 60–140)
HDLC SERPL-MCNC: 63 MG/DL
POC CHOLESTEROL, LDL (DOCK): 167 MG/DL
POC CHOLESTEROL, TOTAL: 250 MG/DL
TRIGL SERPL-MCNC: 103 MG/DL

## 2022-10-03 ENCOUNTER — CLINICAL SUPPORT (OUTPATIENT)
Dept: OTHER | Facility: CLINIC | Age: 62
End: 2022-10-03
Payer: COMMERCIAL

## 2022-10-03 DIAGNOSIS — Z00.8 ENCOUNTER FOR OTHER GENERAL EXAMINATION: ICD-10-CM

## 2022-10-24 LAB
GLUCOSE SERPL-MCNC: 87 MG/DL (ref 60–140)
HDLC SERPL-MCNC: 72 MG/DL
POC CHOLESTEROL, LDL (DOCK): 166 MG/DL
POC CHOLESTEROL, TOTAL: 254 MG/DL
TRIGL SERPL-MCNC: 96 MG/DL

## 2022-10-29 VITALS
WEIGHT: 164 LBS | SYSTOLIC BLOOD PRESSURE: 134 MMHG | DIASTOLIC BLOOD PRESSURE: 90 MMHG | HEIGHT: 64 IN | BODY MASS INDEX: 28 KG/M2

## 2024-04-18 ENCOUNTER — TELEPHONE (OUTPATIENT)
Dept: ENDOSCOPY | Facility: HOSPITAL | Age: 64
End: 2024-04-18
Payer: COMMERCIAL

## 2024-04-18 NOTE — TELEPHONE ENCOUNTER
Contacted the patient in regards to below message. The patient did not answer the call and left a voice message requesting a call back.

## 2024-04-18 NOTE — TELEPHONE ENCOUNTER
FW: Appointment Request   Kindra Lindsey, RN   Sent:  12:56 PM   To: Trista Perdue RN   Flags: Appointment Access, Same Day Access Requested    Allyssa UMANA Sara   MRN: [id number] : 1960   Pt Home: [phone]     Entered: [phone]        Message       ----- Message -----   From: Soledad Shahid   Sent: 2024  12:27 PM CDT   To: [facility] Schedulers   Subject: FW: Appointment Request                            Hello,       We received this appointment request at the [facility], but I do not have access to schedule. Please contact this patient in regards to the below appointment request, and let me know if I can do anything to assist.      Thank you!   Soledad CALL   ----- Message -----   From: Allyssa Ruiz   Sent: 2024   5:40 PM CDT   To: [facility]   Subject: Appointment Request                                Appointment Request From: Allyssa Ruiz      With Provider: colonoscopy      Preferred Date Range: Any      Preferred Times: Any Time      Reason for visit: colonoscopy      Comments:    afshan neal

## 2024-12-13 NOTE — TELEPHONE ENCOUNTER
12/13/24 Update CM Note. Pt admitted 12/4/24 GIB. S/P EGD 12/5/24-clips placed. IV Unasyn for CAP. Awaiting echo and final ID plan.  PT8/OT13.  Planning fo SNF upon DC. Pt accepted at [facility].  Destination/[facility] updated [facility] form/envelope om chart.  PEYTON completed   Yasmine GREENN RN-BC  [phone]       LMOVM for pt to call regarding EUS Bx results.